# Patient Record
Sex: FEMALE | Race: BLACK OR AFRICAN AMERICAN | NOT HISPANIC OR LATINO | URBAN - METROPOLITAN AREA
[De-identification: names, ages, dates, MRNs, and addresses within clinical notes are randomized per-mention and may not be internally consistent; named-entity substitution may affect disease eponyms.]

---

## 2023-01-23 ENCOUNTER — EMERGENCY (EMERGENCY)
Facility: HOSPITAL | Age: 28
LOS: 0 days | Discharge: HOME | End: 2023-01-23
Payer: COMMERCIAL

## 2023-01-23 VITALS
HEIGHT: 63 IN | WEIGHT: 179.9 LBS | DIASTOLIC BLOOD PRESSURE: 76 MMHG | OXYGEN SATURATION: 99 % | RESPIRATION RATE: 18 BRPM | SYSTOLIC BLOOD PRESSURE: 108 MMHG | TEMPERATURE: 98 F | HEART RATE: 73 BPM

## 2023-01-23 VITALS
SYSTOLIC BLOOD PRESSURE: 110 MMHG | HEART RATE: 81 BPM | DIASTOLIC BLOOD PRESSURE: 74 MMHG | TEMPERATURE: 98 F | OXYGEN SATURATION: 99 % | RESPIRATION RATE: 16 BRPM

## 2023-01-23 DIAGNOSIS — M25.562 PAIN IN LEFT KNEE: ICD-10-CM
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
## 2023-01-23 DIAGNOSIS — M54.50 LOW BACK PAIN, UNSPECIFIED: ICD-10-CM
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
## 2023-01-23 PROCEDURE — 73564 X-RAY EXAM KNEE 4 OR MORE: CPT | Mod: 26,LT

## 2023-01-23 PROCEDURE — 73630 X-RAY EXAM OF FOOT: CPT | Mod: 26,LT

## 2023-01-23 PROCEDURE — 72100 X-RAY EXAM L-S SPINE 2/3 VWS: CPT | Mod: 26

## 2023-01-23 PROCEDURE — 99284 EMERGENCY DEPT VISIT MOD MDM: CPT

## 2023-01-23 PROCEDURE — 73590 X-RAY EXAM OF LOWER LEG: CPT | Mod: 26,LT

## 2023-01-23 RX ORDER — LIDOCAINE 4 G/100G
2 CREAM TOPICAL ONCE
Refills: 0 | Status: COMPLETED | OUTPATIENT
Start: 2023-01-23 | End: 2023-01-23

## 2023-01-23 RX ORDER — ACETAMINOPHEN 500 MG
650 TABLET ORAL ONCE
Refills: 0 | Status: COMPLETED | OUTPATIENT
Start: 2023-01-23 | End: 2023-01-23

## 2023-01-23 RX ORDER — DIAZEPAM 5 MG
5 TABLET ORAL ONCE
Refills: 0 | Status: DISCONTINUED | OUTPATIENT
Start: 2023-01-23 | End: 2023-01-23

## 2023-01-23 RX ORDER — KETOROLAC TROMETHAMINE 30 MG/ML
30 SYRINGE (ML) INJECTION ONCE
Refills: 0 | Status: DISCONTINUED | OUTPATIENT
Start: 2023-01-23 | End: 2023-01-23

## 2023-01-23 RX ADMIN — Medication 650 MILLIGRAM(S): at 18:06

## 2023-01-23 RX ADMIN — LIDOCAINE 2 PATCH: 4 CREAM TOPICAL at 18:57

## 2023-01-23 RX ADMIN — Medication 650 MILLIGRAM(S): at 16:06

## 2023-01-23 RX ADMIN — Medication 5 MILLIGRAM(S): at 16:06

## 2023-01-23 RX ADMIN — Medication 30 MILLIGRAM(S): at 16:06

## 2023-01-23 RX ADMIN — Medication 30 MILLIGRAM(S): at 18:06

## 2023-01-23 NOTE — ED PROVIDER NOTE - OBJECTIVE STATEMENT
27y Female with no sig PMHx presents to the ER for fall. Patient reports L knee and back pain after a mechanical trip and fall when she was walking down the street. Patient stepped into a whole and fell onto the left side. Reports hitting head but denies LOC. Reports pain to L knee, L big toe, mid-low back pain. Denies numbness, tingling, weakness, headache, dizziness, sudden changes in vision, chest pain, sob, abdominal pain, n/v/d. Denies pain medications. Denies pregnancy.

## 2023-01-23 NOTE — ED PROVIDER NOTE - NSFOLLOWUPINSTRUCTIONS_ED_ALL_ED_FT
Today you were seen in the ER for knee pain, toe pain and  back pain    Please see below for a copy of your results.     Take Motrin 600 mg every 8 hours as needed for moderate pain-- take with food..    Take Tylenol 650mg (Two 325 mg pills) every 4-6 hours as needed for pain.    Rest, Ice, Elevate injured area    Wear ace wrap as discussed.     Follow-up with Orthopedics, See referred doctor. Call today / next business day for close, prompt follow-up.    Return to Emergency room for any worsening or persistent pain, weakness, numbness, fever, color change to extremity, or any other concerning symptoms.    Advance activity as tolerated.     Continue all previously prescribed medications as directed unless otherwise instructed.     Follow up with your primary care physician in 48-72 hours- bring copies of your results.

## 2023-01-23 NOTE — ED PROVIDER NOTE - PATIENT PORTAL LINK FT
You can access the FollowMyHealth Patient Portal offered by [facility] by registering at the following website: [email or web address]. By joining [employer]’s FollowMyHealth portal, you will also be able to view your health information using other applications (apps) compatible with our system.

## 2023-01-23 NOTE — ED PROVIDER NOTE - CLINICAL SUMMARY MEDICAL DECISION MAKING FREE TEXT BOX
27y Female with no sig PMHx presents to the ER for fall. Patient reports L knee and back pain after a mechanical trip and fall when she was walking down the street. Reports hitting head but denies LOC. Reports pain to L knee, L big toe, mid-low back pain after falling. Denies numbness, tingling, weakness, headache, dizziness, sudden changes in vision, chest pain, sob, abdominal pain, n/v/d. Vital signs stable, tenderness and dec ROM to L big toe, L knee and midline lumbar spine. Neurovascularly intact, sensation intact. Likely contusion vs MSK pain, lower suspicion for fracture or dislocation. Pain medications given, pending XR, will continue to monitor and reassess. Likely dc with pain control and supportive care.

## 2023-01-23 NOTE — ED PROVIDER NOTE - PROGRESS NOTE DETAILS
MARK Meza Patient feeling better after medications, ambulating with some discomfort, ace wrap applied to L knee, hany grier.

## 2025-05-20 NOTE — ED ADULT NURSE NOTE - DISCHARGE DATE/TIME
Eat healthy foods you enjoy. Apixaban/Eliquis DOES NOT have a special diet. Limit your alcohol intake. 23-Jan-2023 20:08